# Patient Record
Sex: FEMALE | Race: WHITE | Employment: FULL TIME | URBAN - METROPOLITAN AREA
[De-identification: names, ages, dates, MRNs, and addresses within clinical notes are randomized per-mention and may not be internally consistent; named-entity substitution may affect disease eponyms.]

---

## 2018-10-02 NOTE — H&P
Warren Marquez  10/1/2018 9:58 AM  Location: The [address] [place] [address]  Patient #: [id number]  : 1980  Single / Language: Stephane Boston / Race: White  Female      History of Present Illness   The patient is a 30year old female who presents for a recheck of Wrist Problem. The patient describes having dull ache, sharp pain and other (shooting and throbbing). The problem is described as being located in the right wrist (recheck for possible right writ arthroscopy and triangular fibrocartilage complex repair on 2018). The symptom has been occuring for 13 weeks (for this incident.  She has been diagnosed with tendinitis and this has been going on for years. ). The symptom occurs all the time. The course has been unchanged (and a little worse since the incident.  She grabbed the bannister going down stairs with her right hand. ). The wrist problem is described as mild (to severe; keeping her awake at night.). The wrist problem is aggravated by flexion of the wrist and extension of the wrist. The wrist problem is relieved by brace (and pressure). The symptoms have been associated with painful ROM, decreased ROM (in brace) and swelling,  while the symptoms have not been associated with numbness or tingling. The wrist problem was preceeded by other (rolling out pizza dough and felt something pop and had shooting pain into her fingers). Previous evaluation done by primary care physician. Previous diagnostic tests have included X-Ray. Last office visit was Date: (2018). The treatment performed last visit was injection (Ulnar carpal joint injection) and medication (Prednisone). The patient's dominant hand is their right. Note: I have reviewed the patient's reason for visit, review of systems, and past medical and social history as documented by my staff.  Pertinent items were discussed with the patient.     Problem List/Past Medical  Sleep apnea (780.57  G47.30)    Headache (784.0  R51)   migraine  Gastroesophageal reflux (530.81  K21.9)    Thyroid disease (246.9  E07.9)    Chiari Malformation    Wrist pain, right (719.43  M25.531)    Right wrist tendonitis (727.05  M77.8)      Allergies   Penicillins   family of cillins  Tylenol with Codeine #3 *ANALGESICS - OPIOID*      Family History   Family history unknown      Social History  Tobacco Use   Current every day smoker. 1/2 to 1 pack per day  Non Drinker/No Alcohol Use    No Drug Use      Medication History  Tylenol Extra Strength  (1000mg Oral prn) Specific strength unknown - Active. Medications Reconciled     Past Surgical History   Cervical Coning   2012  Posterior Fossa Decompression   2013  Lymph Node Removed in Neck   2013    Diagnostic Studies History   MRI   Date: 8/13/2018, Results: . Right Wrist: Thickening and increased intermediate signal in the peripheral lateral ulnar styloid attachment of the TFCC, could represent degeneration of scarring from prior injury, no discrete visualized tear. This could be better evaluation with dedicated MR arthrogram as clinically warranted. Mild extensor carpi ulnaris tendinosis without evidence for tear. X-ray   Date: 7/18/2018, Results: . Right Wrist: No acute pathology in the right wrist. Stable exam.    Other Problems   TFCC (triangular fibrocartilage complex) injury, right, subsequent encounter (V58.89  S69.81XD)          Review of Systems   General Not Present- Chills and Fever. Skin Not Present- Bruising, Pallor and Skin Color Changes. Respiratory Not Present- Cough and Difficulty Breathing. Cardiovascular Not Present- Chest Pain and Fainting / Blacking Out. Musculoskeletal Present- Decreased Range of Motion and Joint Pain. Neurological Not Present- Dysesthesia, Paresthesias and Weakness In Extremities. Hematology Not Present- Abnormal Bleeding and Petechiae. Physical Exam   General  Mental Status - Alert.   General Appearance - Cooperative and Well groomed, Not in acute distress, Not Sickly. Orientation - Oriented X4. Build & Nutrition - Well nourished and Well developed. Posture - Normal posture. Gait - Normal.  Hydration - Well hydrated. Voice - Normal.    Integumentary  General Characteristics  Color - normal coloration of skin. Skin Moisture - normal skin moisture. Texture - normal skin texture. Chest and Lung Exam  Inspection  Chest Wall - Normal. Shape - Normal and Symmetric. Movements - Symmetrical. Accessory muscles - No use of accessory muscles in breathing. Auscultation  Breath sounds - Normal. Adventitious sounds - No Adventitious sounds. Cardiovascular  Auscultation  Heart Sounds - S1 WNL and S2 WNL, No S3. Murmurs & Other Heart Sounds - Auscultation of the heart reveals - No Murmurs. Abdomen  Inspection - Inspection Normal.    Musculoskeletal  Upper Extremity    Hand/Wrist:  Wrist: Inspection and Palpation - Tenderness - moderate, localized and over the ulnar aspect, (R). Swelling - boggy, (R). Wrist - Functional Testing - Ayon Test (Scaphoid Shift Test) positive, (R). Hand - Evaluation of related systems reveals - no digital clubbing or cyanosis and neurovascularly intact bilaterally. Inspection and Palpation - Crepitus - no crepitus bilateral. Sensation is - normal, (R). Instability - Right - no instability or laxity. Hand - Deformities/Malalignments/Discrepancies - no deformities, malalignments, or discrepancies. Functional Testing - Axial Load Test positive, (R). Phalanges:  Right: Thumb - Functional Testing - Flexor Pollicis Longus is intact. Index Finger - Functional Testing - Flexor Digitorum Superficialis is intact and Flexor Digitorum Profundus is intact. Long Finger - Functional Testing - Flexor Digitorum Superficialis is intact and Flexor Digitorum Profundus is intact. Ring Finger - Functional Testing - Flexor Digitorum Superficialis is intact and Flexor Digitorum Profundus is intact.  Small Finger - Functional Testing - Flexor Digitorum Superficialis is intact and Flexor Digitorum Profundus is intact. Assessment & Plan   TFCC (triangular fibrocartilage complex) injury, right, subsequent encounter (V58.89  S69.81XD)  Impression: Her symptoms continue despite the injection and prednisone. We discussed options. Physical exam still points to the TFCC. The MRI also suggests injury in this area. After the discussion she elected to proceed with wrist arthroscopy and potentially TFCC repair. We discussed with her the potential risk in the future for an ulnar shortening osteotomy. We also discussed continued pain. She states she understands and agrees to proceed. Current Plans  The procedure was discussed with the patient and a written consent was obtained and questions were answered.  Risks of the procedure were discussed and include but are not limited to infection, bleeding, nerve, vascular injury as well as the need for future procedures.  It was also discussed the importance of compliance with the treatment directions and participation in the care of the treated extremity. Patient wishes to proceed with the planned RIGHT wrist arthroscopy and triangular fibrocartilage complex repair. List of current medications documented by the Provider ()  Pt Education - General Patient Education: discussed with patient and provided information. Note: Voice recognition software may have been used to generate this report, which may have resulted in some phonetic based errors in grammar and contents.  Even though attempts were made to correct all the mistakes, some may have been missed, and remain in the body of the document.         Signed by Nevaeh Cotto MD

## 2018-10-15 ENCOUNTER — ANESTHESIA EVENT (OUTPATIENT)
Dept: SURGERY | Age: 38
End: 2018-10-15
Payer: MEDICAID

## 2018-10-15 RX ORDER — ACETAMINOPHEN 500 MG
500 TABLET ORAL
COMMUNITY
End: 2018-10-16

## 2018-10-15 RX ORDER — MAG HYDROX/ALUMINUM HYD/SIMETH 200-200-20
30 SUSPENSION, ORAL (FINAL DOSE FORM) ORAL
COMMUNITY
End: 2020-10-01

## 2018-10-15 NOTE — PERIOP NOTES
PAT - SURGICAL PRE-ADMISSION INSTRUCTIONS    NAME:  Yoshi Bass Marquez                                                          TODAY'S DATE:  10/15/2018    SURGERY DATE:  10/16/2018                                  SURGERY ARRIVAL TIME:   0815    1. Do NOT eat or drink anything, including candy or gum, after MIDNIGHT on 10/15/18 , unless you have specific instructions from your Surgeon or Anesthesia Provider to do so. 2. No smoking on the day of surgery. 3. No alcohol 24 hours prior to the day of surgery. 4. No recreational drugs for one week prior to the day of surgery. 5. Leave all valuables, including money/purse, at home. 6. Remove all jewelry, nail polish, makeup (including mascara); no lotions, powders, deodorant, or perfume/cologne/after shave. 7. Glasses/Contact lenses and Dentures may be worn to the hospital.  They will be removed prior to surgery. 8. Call your doctor if symptoms of a cold or illness develop within 24 ours prior to surgery. 9. AN ADULT MUST DRIVE YOU HOME AFTER OUTPATIENT SURGERY. 10. If you are having an OUTPATIENT procedure, please make arrangements for a responsible adult to be with you for 24 hours after your surgery. 11. If you are admitted to the hospital, you will be assigned to a bed after surgery is complete. Normally a family member will not be able to see you until you are in your assigned bed. 15. Family is encouraged to accompany you to the hospital.  We ask visitors in the treatment area to be limited to ONE person at a time to ensure patient privacy. EXCEPTIONS WILL BE MADE AS NEEDED. 15. Children under 12 are discouraged from entering the treatment area and need to be supervised by an adult when in the waiting room. Special Instructions:    NONE. Patient Prep:    shower with anti-bacterial soap    These surgical instructions were reviewed with Fide Rutherford during the PAT phone call. Directions:   On the morning of surgery, please go to [facility]. Enter the building from the [facility] entrance, 1st floor (next to the Emergency Room entrance). Take the elevator to the 2nd floor. Sign in at the Registration Desk.     If you have any questions and/or concerns, please do not hesitate to call:  (Prior to the day of surgery)  [facility] unit:  [phone]  (Day of surgery)  [facility] unit:  [phone]

## 2018-10-16 ENCOUNTER — HOSPITAL ENCOUNTER (OUTPATIENT)
Age: 38
Setting detail: OUTPATIENT SURGERY
Discharge: HOME | End: 2018-10-16
Attending: ORTHOPAEDIC SURGERY | Admitting: ORTHOPAEDIC SURGERY
Payer: MEDICAID

## 2018-10-16 ENCOUNTER — ANESTHESIA (OUTPATIENT)
Dept: SURGERY | Age: 38
End: 2018-10-16
Payer: MEDICAID

## 2018-10-16 VITALS
WEIGHT: 185.38 LBS | HEIGHT: 63 IN | OXYGEN SATURATION: 88 % | TEMPERATURE: 98.5 F | RESPIRATION RATE: 15 BRPM | HEART RATE: 63 BPM | SYSTOLIC BLOOD PRESSURE: 116 MMHG | BODY MASS INDEX: 32.85 KG/M2 | DIASTOLIC BLOOD PRESSURE: 69 MMHG

## 2018-10-16 DIAGNOSIS — S63.591D TFCC (TRIANGULAR FIBROCARTILAGE COMPLEX) TEAR, RIGHT, SUBSEQUENT ENCOUNTER: Primary | ICD-10-CM

## 2018-10-16 PROBLEM — S69.82XD TFCC (TRIANGULAR FIBROCARTILAGE COMPLEX) INJURY, LEFT, SUBSEQUENT ENCOUNTER: Status: ACTIVE | Noted: 2018-10-16

## 2018-10-16 LAB — HCG UR QL: NEGATIVE

## 2018-10-16 PROCEDURE — 76210000026 HC REC RM PH II 1 TO 1.5 HR: Performed by: ORTHOPAEDIC SURGERY

## 2018-10-16 PROCEDURE — 77030018834: Performed by: ORTHOPAEDIC SURGERY

## 2018-10-16 PROCEDURE — 76060000033 HC ANESTHESIA 1 TO 1.5 HR: Performed by: ORTHOPAEDIC SURGERY

## 2018-10-16 PROCEDURE — 77030032490 HC SLV COMPR SCD KNE COVD -B: Performed by: ORTHOPAEDIC SURGERY

## 2018-10-16 PROCEDURE — 74011250637 HC RX REV CODE- 250/637: Performed by: NURSE ANESTHETIST, CERTIFIED REGISTERED

## 2018-10-16 PROCEDURE — 76942 ECHO GUIDE FOR BIOPSY: CPT | Performed by: ANESTHESIOLOGY

## 2018-10-16 PROCEDURE — 76010000161 HC OR TIME 1 TO 1.5 HR INTENSV-TIER 1: Performed by: ORTHOPAEDIC SURGERY

## 2018-10-16 PROCEDURE — 74011250636 HC RX REV CODE- 250/636

## 2018-10-16 PROCEDURE — 74011250636 HC RX REV CODE- 250/636: Performed by: PHYSICIAN ASSISTANT

## 2018-10-16 PROCEDURE — 74011000272 HC RX REV CODE- 272: Performed by: ORTHOPAEDIC SURGERY

## 2018-10-16 PROCEDURE — 74011000250 HC RX REV CODE- 250: Performed by: ORTHOPAEDIC SURGERY

## 2018-10-16 PROCEDURE — 77030008591 HC TRAP FNGR HK CNMD -B: Performed by: ORTHOPAEDIC SURGERY

## 2018-10-16 PROCEDURE — 64415 NJX AA&/STRD BRCH PLXS IMG: CPT | Performed by: ANESTHESIOLOGY

## 2018-10-16 PROCEDURE — 74011250636 HC RX REV CODE- 250/636: Performed by: ANESTHESIOLOGY

## 2018-10-16 PROCEDURE — 74011250636 HC RX REV CODE- 250/636: Performed by: NURSE ANESTHETIST, CERTIFIED REGISTERED

## 2018-10-16 PROCEDURE — 77030003601 HC NDL NRV BLK BBMI -A: Performed by: ANESTHESIOLOGY

## 2018-10-16 PROCEDURE — 77030003015: Performed by: ORTHOPAEDIC SURGERY

## 2018-10-16 PROCEDURE — 77030002966 HC SUT PDS J&J -A: Performed by: ORTHOPAEDIC SURGERY

## 2018-10-16 PROCEDURE — 81025 URINE PREGNANCY TEST: CPT

## 2018-10-16 PROCEDURE — 77030020753 HC CUF TRNQT 1BLA STRY -B: Performed by: ORTHOPAEDIC SURGERY

## 2018-10-16 RX ORDER — SODIUM CHLORIDE 0.9 % (FLUSH) 0.9 %
5-10 SYRINGE (ML) INJECTION EVERY 8 HOURS
Status: DISCONTINUED | OUTPATIENT
Start: 2018-10-16 | End: 2018-10-16 | Stop reason: HOSPADM

## 2018-10-16 RX ORDER — ROPIVACAINE HYDROCHLORIDE 2 MG/ML
30 INJECTION, SOLUTION EPIDURAL; INFILTRATION; PERINEURAL
Status: DISCONTINUED | OUTPATIENT
Start: 2018-10-16 | End: 2018-10-16 | Stop reason: HOSPADM

## 2018-10-16 RX ORDER — PROPOFOL 10 MG/ML
INJECTION, EMULSION INTRAVENOUS
Status: DISCONTINUED | OUTPATIENT
Start: 2018-10-16 | End: 2018-10-16 | Stop reason: HOSPADM

## 2018-10-16 RX ORDER — DIPHENHYDRAMINE HYDROCHLORIDE 50 MG/ML
25 INJECTION, SOLUTION INTRAMUSCULAR; INTRAVENOUS
Status: CANCELLED | OUTPATIENT
Start: 2018-10-16

## 2018-10-16 RX ORDER — SODIUM CHLORIDE 0.9 % (FLUSH) 0.9 %
5-10 SYRINGE (ML) INJECTION AS NEEDED
Status: DISCONTINUED | OUTPATIENT
Start: 2018-10-16 | End: 2018-10-16 | Stop reason: HOSPADM

## 2018-10-16 RX ORDER — SODIUM CHLORIDE, SODIUM LACTATE, POTASSIUM CHLORIDE, CALCIUM CHLORIDE 600; 310; 30; 20 MG/100ML; MG/100ML; MG/100ML; MG/100ML
25 INJECTION, SOLUTION INTRAVENOUS CONTINUOUS
Status: DISCONTINUED | OUTPATIENT
Start: 2018-10-16 | End: 2018-10-16 | Stop reason: HOSPADM

## 2018-10-16 RX ORDER — OXYCODONE AND ACETAMINOPHEN 5; 325 MG/1; MG/1
1 TABLET ORAL AS NEEDED
Status: CANCELLED | OUTPATIENT
Start: 2018-10-16

## 2018-10-16 RX ORDER — ONDANSETRON 2 MG/ML
INJECTION INTRAMUSCULAR; INTRAVENOUS AS NEEDED
Status: DISCONTINUED | OUTPATIENT
Start: 2018-10-16 | End: 2018-10-16 | Stop reason: HOSPADM

## 2018-10-16 RX ORDER — FENTANYL CITRATE 50 UG/ML
50 INJECTION, SOLUTION INTRAMUSCULAR; INTRAVENOUS AS NEEDED
Status: CANCELLED | OUTPATIENT
Start: 2018-10-16

## 2018-10-16 RX ORDER — FAMOTIDINE 20 MG/1
20 TABLET, FILM COATED ORAL ONCE
Status: COMPLETED | OUTPATIENT
Start: 2018-10-16 | End: 2018-10-16

## 2018-10-16 RX ORDER — CLINDAMYCIN PHOSPHATE 600 MG/50ML
600 INJECTION INTRAVENOUS ONCE
Status: COMPLETED | OUTPATIENT
Start: 2018-10-16 | End: 2018-10-16

## 2018-10-16 RX ORDER — MIDAZOLAM HYDROCHLORIDE 1 MG/ML
2 INJECTION, SOLUTION INTRAMUSCULAR; INTRAVENOUS ONCE
Status: COMPLETED | OUTPATIENT
Start: 2018-10-16 | End: 2018-10-16

## 2018-10-16 RX ORDER — OXYCODONE AND ACETAMINOPHEN 10; 325 MG/1; MG/1
1-2 TABLET ORAL
Qty: 20 TAB | Refills: 0 | Status: SHIPPED | OUTPATIENT
Start: 2018-10-16 | End: 2020-10-01

## 2018-10-16 RX ORDER — DEXAMETHASONE SODIUM PHOSPHATE 4 MG/ML
INJECTION, SOLUTION INTRA-ARTICULAR; INTRALESIONAL; INTRAMUSCULAR; INTRAVENOUS; SOFT TISSUE AS NEEDED
Status: DISCONTINUED | OUTPATIENT
Start: 2018-10-16 | End: 2018-10-16 | Stop reason: HOSPADM

## 2018-10-16 RX ORDER — SODIUM CHLORIDE, SODIUM LACTATE, POTASSIUM CHLORIDE, CALCIUM CHLORIDE 600; 310; 30; 20 MG/100ML; MG/100ML; MG/100ML; MG/100ML
75 INJECTION, SOLUTION INTRAVENOUS CONTINUOUS
Status: CANCELLED | OUTPATIENT
Start: 2018-10-16

## 2018-10-16 RX ORDER — LIDOCAINE HYDROCHLORIDE 20 MG/ML
INJECTION, SOLUTION EPIDURAL; INFILTRATION; INTRACAUDAL; PERINEURAL AS NEEDED
Status: DISCONTINUED | OUTPATIENT
Start: 2018-10-16 | End: 2018-10-16 | Stop reason: HOSPADM

## 2018-10-16 RX ORDER — FENTANYL CITRATE 50 UG/ML
100 INJECTION, SOLUTION INTRAMUSCULAR; INTRAVENOUS ONCE
Status: COMPLETED | OUTPATIENT
Start: 2018-10-16 | End: 2018-10-16

## 2018-10-16 RX ORDER — KETOROLAC TROMETHAMINE 30 MG/ML
INJECTION, SOLUTION INTRAMUSCULAR; INTRAVENOUS AS NEEDED
Status: DISCONTINUED | OUTPATIENT
Start: 2018-10-16 | End: 2018-10-16 | Stop reason: HOSPADM

## 2018-10-16 RX ADMIN — DEXAMETHASONE SODIUM PHOSPHATE 4 MG: 4 INJECTION, SOLUTION INTRA-ARTICULAR; INTRALESIONAL; INTRAMUSCULAR; INTRAVENOUS; SOFT TISSUE at 10:06

## 2018-10-16 RX ADMIN — CLINDAMYCIN PHOSPHATE 600 MG: 600 INJECTION INTRAVENOUS at 10:01

## 2018-10-16 RX ADMIN — SODIUM CHLORIDE, SODIUM LACTATE, POTASSIUM CHLORIDE, AND CALCIUM CHLORIDE 25 ML/HR: 600; 310; 30; 20 INJECTION, SOLUTION INTRAVENOUS at 09:16

## 2018-10-16 RX ADMIN — PROPOFOL 200 MCG/KG/MIN: 10 INJECTION, EMULSION INTRAVENOUS at 10:06

## 2018-10-16 RX ADMIN — MIDAZOLAM 2 MG: 1 INJECTION INTRAMUSCULAR; INTRAVENOUS at 09:26

## 2018-10-16 RX ADMIN — LIDOCAINE HYDROCHLORIDE 100 MG: 20 INJECTION, SOLUTION EPIDURAL; INFILTRATION; INTRACAUDAL; PERINEURAL at 10:06

## 2018-10-16 RX ADMIN — KETOROLAC TROMETHAMINE 30 MG: 30 INJECTION, SOLUTION INTRAMUSCULAR; INTRAVENOUS at 10:06

## 2018-10-16 RX ADMIN — ONDANSETRON 4 MG: 2 INJECTION INTRAMUSCULAR; INTRAVENOUS at 10:06

## 2018-10-16 RX ADMIN — FENTANYL CITRATE 100 MCG: 50 INJECTION, SOLUTION INTRAMUSCULAR; INTRAVENOUS at 09:26

## 2018-10-16 RX ADMIN — FAMOTIDINE 20 MG: 20 TABLET ORAL at 09:20

## 2018-10-16 NOTE — PERIOP NOTES
Phase 2 Recovery Summary  Patient arrived to Phase 2 at 1127  Report received from [place], CRNA  Vitals:    10/16/18 0904 10/16/18 0932 10/16/18 1127 10/16/18 1131   BP: 120/76  116/69 116/69   Pulse: 72  76 63   Resp:  15     Temp: 98.5 °F (36.9 °C)      SpO2: 97%  (!) 88%    Weight: 84.1 kg (185 lb 6 oz)      Height: 5' 3\" (1.6 m)          oriented to time, place, person and situation    Lines and Drains  Peripheral Intravenous Line:   Peripheral IV 10/16/18 Left Arm (Active)   Site Assessment Clean 10/16/2018  9:16 AM   Phlebitis Assessment 0 10/16/2018  9:16 AM   Infiltration Assessment 0 10/16/2018  9:16 AM   Dressing Status Clean, dry, & intact 10/16/2018  9:16 AM   Dressing Type Transparent;Tape 10/16/2018  9:16 AM   Hub Color/Line Status Pink; Infusing 10/16/2018  9:16 AM       Wound  Wound Wrist Right (Active)   DRESSING STATUS Clean, dry, and intact 10/16/2018 11:12 AM   DRESSING TYPE 4 x 4;Xeroform 10/16/2018 11:12 AM   SPLINT TYPE/MATERIAL Cast, plaster 10/16/2018 11:12 AM   Number of days:0          Patient arrived in phase 2 recovery lethargic. Patient placed on 3L of O2 via nasal cannula to keep saturation above 88%. Patient voiced mild discomfort to RUE but stated that it was tolerable. Discharge instructions given to patient and family prior to discharge. Instructions acknowledged by all parties. O2 rechecked prior to discharge and noted to be 97% on room air. Arm placed in sling and ice pack applied. Patient taken to private vehicle via wheelchair and discharged from facility in stable condition. Patient discharged to home with mother  at 200.     [facility]

## 2018-10-16 NOTE — ANESTHESIA POSTPROCEDURE EVALUATION
Post-Anesthesia Evaluation and Assessment Patient: Jessica Hdz MRN: [id number]  SSN: [id number] YOB: 1980  Age: 41 y.o. Sex: female Cardiovascular Function/Vital Signs Visit Vitals  /69 (BP 1 Location: Left arm)  Pulse 63  Temp 36.9 °C (98.5 °F)  Resp 15  Ht 5' 3\" (1.6 m)  Wt 84.1 kg (185 lb 6 oz)  SpO2 (!) 88%  BMI 32.84 kg/m2 Patient is status post general, regional anesthesia for Procedure(s): RIGHT WRIST ARTHROSCOPY AND TFCC REPAIR. Nausea/Vomiting: None Postoperative hydration reviewed and adequate. Pain: 
Pain Scale 1: Numeric (0 - 10) (10/16/18 4999) Pain Intensity 1: 5 (10/16/18 0904) Managed Neurological Status:  
Neuro (WDL): Within Defined Limits (10/16/18 0913) At baseline Mental Status and Level of Consciousness: Alert and oriented Pulmonary Status:  
O2 Device: Nasal cannula (10/16/18 1131) Adequate oxygenation and airway patent Complications related to anesthesia: None Post-anesthesia assessment completed. No concerns Signed By: Ana María Muniz CRNA October 16, 2018

## 2018-10-16 NOTE — DISCHARGE INSTRUCTIONS
Candy Montejo PA-C   Upper Extremity Surgery   Discharge Instructions   Please take the time to review the following instructions before you leave the hospital and use them as guidelines during your recovery from surgery. If you have any questions you may contact my office at [phone]. Wound Care/Dressing Changes:   Dont remove your dressing or get them wet. It isnt necessary to apply antibiotic ointment to your incisions. Sutures will be removed at your one week post-op visit. Staples (if you have them) are removed in two weeks. If you have steri-strips over your incision they will start to peel off in 7-10 days as you get them wet. They dont need to be removed prior to that. When they begin to peel off, you may remove them. They should all be removed by 14 days from your surgery. Showering/Bathing: You may shower after your surgery. Your dressing may NOT be removed for showering. Do not take a bath or get into a swimming pool or Jacuzzi until the incisions are completely healed. This may take about 14 days. Do not soak your incision under water. Sling: You are not required to wear your sling and should do so only as needed for comfort. You have no restrictions with regards to the movement of your shoulder. Please push to achieve full range of motion as soon as possible. Please follow motion instructions given at the time of surgery. Ice/Elevation   Continue ice consistently for 24 hours after surgery. After 48 hours, you should ice your hand 3 times per day, for 20 minutes at a time for the next 5 days. After one week from surgery, you may use ice as needed for pain and swelling. Elevate the extremity higher than your heart for the next 24 - 48 hours. If you get throbbing this is telling you to elevate the extremity. Diet:   You may advance to your regular diet as tolerated. Medication:   1.  You will be given a prescription for pain medication when you are discharged from the hospital. Take the medication as needed according to the directions on the prescription bottle. Possible side effects of the medication include dizziness, headache, nausea, vomiting, constipation and urinary retention. If you experience any of these side effects call the office so that we can assist you in relieving them. Discontinue the use of the pain medication if you develop itching, rash, shortness of breath or difficulties swallowing. If these symptoms become severe or arent relieved by discontinuing the medication you should seek immediate medical attention. Refills of pain medication are authorized during office hours only. (7 AM-3PM Mon. thru Fri.)    2. If you were prescribed Percocet/oxycodone you must have a written prescription. These medications legally cannot be called in to the pharmacy. 3. You may take over the counter Ibuprofen/Advil/Aleve between dosages of your pain medication if needed. Do not take Tylenol in addition to your pain medication as most of the pain medication already contains Tylenol. Do not exceed 3000mg of Tylenol per day. Ex: (hydrocodone 5/325g= 325mg of Tylenol)  4. You may resume the medication you were taking prior to surgery. Pain medication may change the effects of any antidepressant medication you are taking. If you have any questions about possible interactions between your regular medications and the pain medication you should consult the physician who prescribes your regular medications. Follow-up:   Check the letter for Follow-up appointment or call [phone] for questions.        DISCHARGE SUMMARY from Nurse    PATIENT INSTRUCTIONS:    After general anesthesia or intravenous sedation, for 24 hours or while taking prescription Narcotics:  · Limit your activities  · Do not drive and operate hazardous machinery  · Do not make important personal or business decisions  · Do  not drink alcoholic beverages  · If you have not urinated within 8 hours after discharge, please contact your surgeon on call. Report the following to your surgeon:  · Excessive pain, swelling, redness or odor of or around the surgical area  · Temperature over 100.5  · Nausea and vomiting lasting longer than 4 hours or if unable to take medications  · Any signs of decreased circulation or nerve impairment to extremity: change in color, persistent  numbness, tingling, coldness or increase pain  · Any questions    What to do at Home:  Recommended activity: Activity as tolerated and no driving for today,     These are general instructions for a healthy lifestyle:    No smoking/ No tobacco products/ Avoid exposure to second hand smoke  Surgeon General's Warning:  Quitting smoking now greatly reduces serious risk to your health. Obesity, smoking, and sedentary lifestyle greatly increases your risk for illness    A healthy diet, regular physical exercise & weight monitoring are important for maintaining a healthy lifestyle    You may be retaining fluid if you have a history of heart failure or if you experience any of the following symptoms:  Weight gain of 3 pounds or more overnight or 5 pounds in a week, increased swelling in our hands or feet or shortness of breath while lying flat in bed. Please call your doctor as soon as you notice any of these symptoms; do not wait until your next office visit. Recognize signs and symptoms of STROKE:    F-face looks uneven    A-arms unable to move or move unevenly    S-speech slurred or non-existent    T-time-call 911 as soon as signs and symptoms begin-DO NOT go       Back to bed or wait to see if you get better-TIME IS BRAIN. Warning Signs of HEART ATTACK     Call 911 if you have these symptoms:   Chest discomfort. Most heart attacks involve discomfort in the center of the chest that lasts more than a few minutes, or that goes away and comes back.  It can feel like uncomfortable pressure, squeezing, fullness, or pain.  Discomfort in other areas of the upper body. Symptoms can include pain or discomfort in one or both arms, the back, neck, jaw, or stomach.  Shortness of breath with or without chest discomfort.  Other signs may include breaking out in a cold sweat, nausea, or lightheadedness. Don't wait more than five minutes to call 911 - MINUTES MATTER! Fast action can save your life. Calling 911 is almost always the fastest way to get lifesaving treatment. Emergency Medical Services staff can begin treatment when they arrive -- up to an hour sooner than if someone gets to the hospital by car. The discharge information has been reviewed with the patient. The patient verbalized understanding. Discharge medications reviewed with the patient and appropriate educational materials and side effects teaching were provided. Patient armband removed and given to patient to take home.   Patient was informed of the privacy risks if armband lost or stolen    ___________________________________________________________________________________________________________________________________

## 2018-10-16 NOTE — IP AVS SNAPSHOT
[address] [facility] [address] 
 
 
 [address] 
[phone] Patient: Griffith Cranker MRN: [id number] :1980 About your hospitalization You were admitted on:  2018 You last received care in the:  [address] PHASE 2 RECOVERY You were discharged on:  2018 Why you were hospitalized Your primary diagnosis was: Tfcc (Triangular Fibrocartilage Complex) Tear, Right, Subsequent Encounter Follow-up Information Follow up With Details Comments Contact Info Maegan Kirby MD  If symptoms worsen, For suture removal, For wound re-check, as scheduled [address] [facility] [address] [facility] [place] [facility] [address] 
[phone] Rangel Paris MD   [address] [facility] [address] [address] 
[phone] Discharge Orders None A check timothy indicates which time of day the medication should be taken. My Medications START taking these medications Instructions Each Dose to Equal  
 Morning Noon Evening Bedtime  
 oxyCODONE-acetaminophen  mg per tablet Commonly known as:  PERCOCET 10 Your last dose was: Your next dose is: Take 1-2 Tabs by mouth every six (6) hours as needed for Pain. Max Daily Amount: 8 Tabs. 1-2 Tab CONTINUE taking these medications Instructions Each Dose to Equal  
 Morning Noon Evening Bedtime CAMBIA 50 mg Pwpk Generic drug:  Diclofenac Potassium Your last dose was: Your next dose is: Take 50 mg by mouth as needed. Indications: Migraine 50 mg  
    
   
   
   
  
 MAALOX ADVANCED 200-200-20 mg/5 mL Susp Generic drug:  alum-mag hydroxide-simeth Your last dose was: Your next dose is: Take 30 mL by mouth every four (4) hours as needed. 30 mL STOP taking these medications TYLENOL EXTRA STRENGTH 500 mg tablet Generic drug:  acetaminophen Where to Get Your Medications Information on where to get these meds will be given to you by the nurse or doctor. ! Ask your nurse or doctor about these medications  
  oxyCODONE-acetaminophen  mg per tablet Opioid Education Prescription Opioids: What You Need to Know: 
 
Prescription opioids can be used to help relieve moderate-to-severe pain and are often prescribed following a surgery or injury, or for certain health conditions. These medications can be an important part of treatment but also come with serious risks. Opioids are strong pain medicines. Examples include hydrocodone, oxycodone, fentanyl, and morphine. Heroin is an example of an illegal opioid. It is important to work with your health care provider to make sure you are getting the safest, most effective care. WHAT ARE THE RISKS AND SIDE EFFECTS OF OPIOID USE? Prescription opioids carry serious risks of addiction and overdose, especially with prolonged use. An opioid overdose, often marked by slow breathing, can cause sudden death. The use of prescription opioids can have a number of side effects as well, even when taken as directed. · Tolerance-meaning you might need to take more of a medication for the same pain relief · Physical dependence-meaning you have symptoms of withdrawal when the medication is stopped. Withdrawal symptoms can include nausea, sweating, chills, diarrhea, stomach cramps, and muscle aches. Withdrawal can last up to several weeks, depending on which drug you took and how long you took it. · Increased sensitivity to pain · Constipation · Nausea, vomiting, and dry mouth · Sleepiness and dizziness · Confusion · Depression · Low levels of testosterone that can result in lower sex drive, energy, and strength · Itching and sweating RISKS ARE GREATER WITH:      
· History of drug misuse, substance use disorder, or overdose · Mental health conditions (such as depression or anxiety) · Sleep apnea · Older age (72 years or older) · Pregnancy Avoid alcohol while taking prescription opioids. Also, unless specifically advised by your health care provider, medications to avoid include: · Benzodiazepines (such as Xanax or Valium) · Muscle relaxants (such as Soma or Flexeril) · Hypnotics (such as Ambien or Lunesta) · Other prescription opioids KNOW YOUR OPTIONS Talk to your health care provider about ways to manage your pain that don't involve prescription opioids. Some of these options may actually work better and have fewer risks and side effects. Consult your physician before adding or stopping any medications, treatments, or physical activity. Options may include: 
· Pain relievers such as acetaminophen, ibuprofen, and naproxen · Some medications that are also used for depression or seizures · Physical therapy and exercise · Counseling to help patients learn how to cope better with triggers of pain and stress. · Application of heat or cold compress · Massage therapy · Relaxation techniques Be Informed Make sure you know the name of your medication, how much and how often to take it, and its potential risks & side effects. IF YOU ARE PRESCRIBED OPIOIDS FOR PAIN: 
· Never take opioids in greater amounts or more often than prescribed. Remember the goal is not to be pain-free but to manage your pain at a tolerable level. · Follow up with your primary care provider to: · Work together to create a plan on how to manage your pain. · Talk about ways to help manage your pain that don't involve prescription opioids. · Talk about any and all concerns and side effects. · Help prevent misuse and abuse. · Never sell or share prescription opioids · Help prevent misuse and abuse.  
· Store prescription opioids in a secure place and out of reach of others (this may include visitors, children, friends, and family). · Safely dispose of unused/unwanted prescription opioids: Find your community drug take-back program or your pharmacy mail-back program, or flush them down the toilet, following guidance from the Food and Drug Administration (www.fda.gov/Drugs/ResourcesForYou). · Visit www.cdc.gov/drugoverdose to learn about the risks of opioid abuse and overdose. · If you believe you may be struggling with addiction, tell your health care provider and ask for guidance or call [employer] at 7-297-079-EAYZ. Discharge Instructions Yaritza Martinez PA-C Upper Extremity Surgery Discharge Instructions Please take the time to review the following instructions before you leave the hospital and use them as guidelines during your recovery from surgery. If you have any questions you may contact my office at [phone]. Wound Care/Dressing Changes:  
Dont remove your dressing or get them wet. It isnt necessary to apply antibiotic ointment to your incisions. Sutures will be removed at your one week post-op visit. Staples (if you have them) are removed in two weeks. If you have steri-strips over your incision they will start to peel off in 7-10 days as you get them wet. They dont need to be removed prior to that. When they begin to peel off, you may remove them. They should all be removed by 14 days from your surgery. Showering/Bathing: You may shower after your surgery. Your dressing may NOT be removed for showering. Do not take a bath or get into a swimming pool or Jacuzzi until the incisions are completely healed. This may take about 14 days. Do not soak your incision under water. Sling: You are not required to wear your sling and should do so only as needed for comfort.  You have no restrictions with regards to the movement of your shoulder. Please push to achieve full range of motion as soon as possible. Please follow motion instructions given at the time of surgery. Ice/Elevation Continue ice consistently for 24 hours after surgery. After 48 hours, you should ice your hand 3 times per day, for 20 minutes at a time for the next 5 days. After one week from surgery, you may use ice as needed for pain and swelling. Elevate the extremity higher than your heart for the next 24 - 48 hours. If you get throbbing this is telling you to elevate the extremity. Diet:  
You may advance to your regular diet as tolerated. Medication:  
1. You will be given a prescription for pain medication when you are discharged from the hospital. Take the medication as needed according to the directions on the prescription bottle. Possible side effects of the medication include dizziness, headache, nausea, vomiting, constipation and urinary retention. If you experience any of these side effects call the office so that we can assist you in relieving them. Discontinue the use of the pain medication if you develop itching, rash, shortness of breath or difficulties swallowing. If these symptoms become severe or arent relieved by discontinuing the medication you should seek immediate medical attention. Refills of pain medication are authorized during office hours only. (7 AM-3PM Mon. thru Fri.) 2. If you were prescribed Percocet/oxycodone you must have a written prescription. These medications legally cannot be called in to the pharmacy. 3. You may take over the counter Ibuprofen/Advil/Aleve between dosages of your pain medication if needed. Do not take Tylenol in addition to your pain medication as most of the pain medication already contains Tylenol. Do not exceed 3000mg of Tylenol per day. Ex: (hydrocodone 5/325g= 325mg of Tylenol) 4. You may resume the medication you were taking prior to surgery.  Pain medication may change the effects of any antidepressant medication you are taking. If you have any questions about possible interactions between your regular medications and the pain medication you should consult the physician who prescribes your regular medications. Follow-up:   Check the letter for Follow-up appointment or call [phone] for questions. DISCHARGE SUMMARY from Nurse PATIENT INSTRUCTIONS: 
 
After general anesthesia or intravenous sedation, for 24 hours or while taking prescription Narcotics: · Limit your activities · Do not drive and operate hazardous machinery · Do not make important personal or business decisions · Do  not drink alcoholic beverages · If you have not urinated within 8 hours after discharge, please contact your surgeon on call. Report the following to your surgeon: 
· Excessive pain, swelling, redness or odor of or around the surgical area · Temperature over 100.5 · Nausea and vomiting lasting longer than 4 hours or if unable to take medications · Any signs of decreased circulation or nerve impairment to extremity: change in color, persistent  numbness, tingling, coldness or increase pain · Any questions What to do at Home: 
Recommended activity: Activity as tolerated and no driving for today, These are general instructions for a healthy lifestyle: No smoking/ No tobacco products/ Avoid exposure to second hand smoke Surgeon General's Warning:  Quitting smoking now greatly reduces serious risk to your health. Obesity, smoking, and sedentary lifestyle greatly increases your risk for illness A healthy diet, regular physical exercise & weight monitoring are important for maintaining a healthy lifestyle You may be retaining fluid if you have a history of heart failure or if you experience any of the following symptoms:  Weight gain of 3 pounds or more overnight or 5 pounds in a week, increased swelling in our hands or feet or shortness of breath while lying flat in bed. Please call your doctor as soon as you notice any of these symptoms; do not wait until your next office visit. Recognize signs and symptoms of STROKE: 
 
F-face looks uneven A-arms unable to move or move unevenly S-speech slurred or non-existent T-time-call 911 as soon as signs and symptoms begin-DO NOT go Back to bed or wait to see if you get better-TIME IS BRAIN. Warning Signs of HEART ATTACK Call 911 if you have these symptoms: 
? Chest discomfort. Most heart attacks involve discomfort in the center of the chest that lasts more than a few minutes, or that goes away and comes back. It can feel like uncomfortable pressure, squeezing, fullness, or pain. ? Discomfort in other areas of the upper body. Symptoms can include pain or discomfort in one or both arms, the back, neck, jaw, or stomach. ? Shortness of breath with or without chest discomfort. ? Other signs may include breaking out in a cold sweat, nausea, or lightheadedness. Don't wait more than five minutes to call [address]! Fast action can save your life. Calling 911 is almost always the fastest way to get lifesaving treatment. Emergency Medical Services staff can begin treatment when they arrive  up to an hour sooner than if someone gets to the hospital by car. The discharge information has been reviewed with the patient. The patient verbalized understanding. Discharge medications reviewed with the patient and appropriate educational materials and side effects teaching were provided. Patient armband removed and given to patient to take home. Patient was informed of the privacy risks if armband lost or stolen 
 
___________________________________________________________________________________________________________________________________ Introducing [facility]!    
 [employer] introduces [employer] patient portal. Now you can access parts of your medical record, email your doctor's office, and request medication refills online. 1. In your internet browser, go to [email or web address] [employer] 2. Click on the First Time User? Click Here link in the Sign In box. You will see the New Member Sign Up page. 3. Enter your Beryllium Access Code exactly as it appears below. You will not need to use this code after youve completed the sign-up process. If you do not sign up before the expiration date, you must request a new code. · [employer] Access Code: [email or web address] Expires: 1/8/2019  3:01 PM 
 
4. Enter the last four digits of your Social Security Number (xxxx) and Date of Birth (mm/dd/yyyy) as indicated and click Submit. You will be taken to the next sign-up page. 5. Create a Beryllium ID. This will be your B[employer]ium login ID and cannot be changed, so think of one that is secure and easy to remember. 6. Create a [employer] password. You can change your password at any time. 7. Enter your Password Reset Question and Answer. This can be used at a later time if you forget your password. 8. Enter your e-mail address. You will receive e-mail notification when new information is available in [address] 9. Click Sign Up. You can now view and download portions of your medical record. 10. Click the Download Summary menu link to download a portable copy of your medical information. If you have questions, please visit the Frequently Asked Questions section of the [employer] website. Remember, [employer] is NOT to be used for urgent needs. For medical emergencies, dial 911. Now available from your iPhone and Android! Introducing James Obrien As a [employer] patient, I wanted to make you aware of our electronic visit tool called James Obrien. [employer] 24/7 allows you to connect within minutes with a medical provider 24 hours a day, seven days a week via a mobile device or tablet or logging into a secure website from your computer. You can access [employer] from anywhere in the [place]. A virtual visit might be right for you when you have a simple condition and feel like you just dont want to get out of bed, or cant get away from work for an appointment, when your regular [facility] provider is not available (evenings, weekends or holidays), or when youre out of town and need minor care. Electronic visits cost only $49 and if the [facility] 24/7 provider determines a prescription is needed to treat your condition, one can be electronically transmitted to a nearby pharmacy*. Please take a moment to enroll today if you have not already done so. The enrollment process is free and takes just a few minutes. To enroll, please download the [facility]/Book&Table re to your tablet or phone, or visit [email or web address] to enroll on your computer. And, as an [address] [place] [address] patient with a [employer] account, the results of your visits will be scanned into your electronic medical record and your primary care provider will be able to view the scanned results. We urge you to continue to see your regular [facility] provider for your ongoing medical care. And while your primary care provider may not be the one available when you seek a James Fernandezfin virtual visit, the peace of mind you get from getting a real diagnosis real time can be priceless. For more information on [employer], view our Frequently Asked Questions (FAQs) at [email or web address]. Sincerely, 
 
Brian Padilla MD 
Chief Medical Officer [employer] *:  certain medications cannot be prescribed via [employer] Providers Seen During Your Hospitalization Provider Specialty Primary office phone Yaquelin Vu MD Orthopedic Surgery [phone] Your Primary Care Physician (PCP) Primary Care Physician Office Phone Office Fax [address], [address] [phone] You are allergic to the following Allergen Reactions Codeine Nausea and Vomiting Keflex (Cephalexin) Rash Blisters Penicillin V Potassium Rash Myalgia All Cillins Recent Documentation Height Weight BMI OB Status Smoking Status 1.6 m 84.1 kg 32.84 kg/m2 Having regular periods Current Every Day Smoker Emergency Contacts Name Discharge Info Relation Home Work Mobile [address] CAREGIVER [3] Parent [1] [phone] Patient Belongings The following personal items are in your possession at time of discharge: 
  Dental Appliances: None  Visual Aid: None Please provide this summary of care documentation to your next provider. Signatures-by signing, you are acknowledging that this After Visit Summary has been reviewed with you and you have received a copy. Patient Signature:  ____________________________________________________________ Date:  ____________________________________________________________  
  
Nnamdi Delacruz Provider Signature:  ____________________________________________________________ Date:  ____________________________________________________________

## 2018-10-16 NOTE — BRIEF OP NOTE
BRIEF OPERATIVE NOTE      BRIEF OPERATIVE NOTE    Patient: Carlene Mccormick MRN: [id number]  CSN: [id number]    YOB: 1980  Age: 41 y.o. Sex: female        Date of Procedure: 10/16/2018     Preoperative Diagnosis: right wrist tfcc tear wrist pain s69.81xd    Postoperative Diagnosis: right wrist tfcc tear wrist pain s69.81xd      Procedure: Procedure(s):  RIGHT WRIST ARTHROSCOPY AND TFCC REPAIR     Surgeon: Anna Girard MD      First Assistant:   Gabrielle Santos  Anesthesia Staff: Anesthesiologist: Theo Stevenson MD  CRNA: Johanny Pereyra CRNA    Anesthesia: General      Local Used: 0 2% lidocaine and 0.05 % marcaine  50:50 mix    Fluid:  700 cc crystalloid    Tourniquet Time:   0 minutes @  200  mmHg        Estimated Blood Loss: < 10 cc    Specimens: * No specimens in log *     Implants: * No implants in log *    Findings: central slit. Peripheral tear    Complications: None; patient tolerated the procedure well.       Anna Girard MD  10/16/2018  11:12 AM      .

## 2018-10-16 NOTE — ANESTHESIA PROCEDURE NOTES
Peripheral Block Start time: 10/16/2018 9:26 AM 
End time: 10/16/2018 9:43 AM 
Performed by: Timur Francis Authorized by: Timur Francis Pre-procedure: Indications: at surgeon's request, post-op pain management and procedure for pain Preanesthetic Checklist: patient identified, risks and benefits discussed, site marked, timeout performed, anesthesia consent given and patient being monitored Timeout Time: 09:24 Block Type:  
Block Type:  Brachial plexus and supraclavicular Laterality:  Right Monitoring:  Standard ASA monitoring, continuous pulse ox, frequent vital sign checks, oxygen, responsive to questions and heart rate Injection Technique:  Single shot Procedures: ultrasound guided and nerve stimulator Prep: chlorhexidine Location:  Supraclavicular Needle Type:  Stimuplex Needle Gauge:  22 G Needle Localization:  Ultrasound guidance and nerve stimulator Medication Injected:  0.5% 
ropivacaine Volume (mL):  25 
 
Assessment: 
Number of attempts:  1 Injection Assessment:  No intravascular symptoms, negative aspiration for blood, local visualized surrounding nerve on ultrasound, ultrasound image on chart, no paresthesia and incremental injection every 5 mL Patient tolerance:  Patient tolerated the procedure well with no immediate complications Location:  PREOP HOLDING Patient given 2 mg IV Versed and 100 mcg IV Fentanyl for sedation.  
 
10/16/2018     9:46 AM     Kush Mead MD

## 2018-10-16 NOTE — ANESTHESIA PREPROCEDURE EVALUATION
Anesthetic History No history of anesthetic complications Review of Systems / Medical History Patient summary reviewed and pertinent labs reviewed Pulmonary Sleep apnea Undiagnosed apnea and smoker Neuro/Psych Within defined limits Comments: HA 
H/o chiari malformation repair Cardiovascular Within defined limits Exercise tolerance: >4 METS 
  
GI/Hepatic/Renal 
  
GERD Endo/Other Obesity Other Findings Physical Exam 
 
Airway Mallampati: II 
TM Distance: 4 - 6 cm Neck ROM: normal range of motion Mouth opening: Normal 
 
 Cardiovascular Regular rate and rhythm,  S1 and S2 normal,  no murmur, click, rub, or gallop Rhythm: regular Rate: normal 
 
 
 
 Dental 
 
Dentition: Lower dentition intact and Upper dentition intact Pulmonary Breath sounds clear to auscultation Abdominal 
GI exam deferred Other Findings Anesthetic Plan ASA: 2 Anesthesia type: general and regional - supraclavicular block Induction: Intravenous Anesthetic plan and risks discussed with: Patient

## 2018-10-16 NOTE — INTERVAL H&P NOTE
H&P Update:  Edward Quintanilla was seen and examined. History and physical has been reviewed. The patient has been examined. There have been no significant clinical changes since the completion of the originally dated History and Physical.  Patient identified by surgeon; surgical site was confirmed by patient and surgeon.     Signed By: Corinne Whitt MD     October 16, 2018 9:07 AM

## 2018-10-17 NOTE — OP NOTES
[address] REPORT    Name:Florentino PAEZ  MR#: [id number]  : 1980  ACCOUNT #: [de-identified]   DATE OF SERVICE: 10/16/2018    PREOPERATIVE DIAGNOSIS:  Right wrist triangular fibrocartilage complex tear. POSTOPERATIVE DIAGNOSIS:  Right wrist triangular fibrocartilage complex tear. PROCEDURES PERFORMED:  Right wrist arthroscopy and triangular fibrocartilage complex  repair. SURGEON:  Miranda Castaneda MD    ASSISTANT:  Rony Barry     ANESTHESIA:  Regional general.    TOURNIQUET TIME:  None used. LOCAL:  None used. She had a supraclavicular block. ESTIMATED BLOOD LOSS:  < 10 cc    SPECIMENS REMOVED:  None. COMPLICATIONS:  None. IMPLANTS:  None. INDICATIONS FOR PROCEDURE:  Patient is a pleasant 61-year-old female who has right wrist pain. We proceeded with conservative management of her wrist pain including immobilization. She has continued tenderness at the distal ulna. We proceeded with an injection with short-term relief, but then the symptoms continued. We discussed with her proceeding with arthroscopy and potential TFCC repair. The MRI suggested a central tear of the TFCC. There was, however, synovitis along the periphery. Risks and benefits of surgery were discussed with her including, but not limited to infection, bleeding, neurovascular injury, need for future surgeries, as well as continued pain. She states she understands and agrees to proceed. NARRATION OF PROCEDURE:  After proper consent was obtained, the extremity was marked. Regional anesthesia was performed. The patient was brought to the OR, received IV sedation general.  Limb was prepped and draped. A proper timeout was taken. Perioperative antibiotics given. Limb was placed in a [employer] traction tower with 10 pounds of traction. Upon completion of this, the 3-4 portal was insufflated. An 11 blade was used, followed by blunt dissection into the joint. The scope was entered.   A 4-5 portal was established in similar fashion. Outflow was established at [facility]. Bringing the scope in, one could see the volar ligament, the radioscaphocapitate ligaments, both long and short. With this, we came over the scapholunate ligament, was found to be intact. The dorsal surface of the scapholunate ligament was found to be intact. One could see evidence that the TFCC had a small central split. Along the periphery, there was evidence of synovitis. The shaver was brought in and we removed the dorsal synovitis along the dorsal rim of the TFCC. It is also important to note that when probing the TFCC, it was more of a soft featherbed palpation than it was a taut TFCC that was functional.    We then proceeded with removing some synovitis along the radial aspect of the wrist as well. Then, again, probing the rim one could see evidence of the rent in the TFCC. Therefore, we proceeded with TFCC repair. A longitudinal incision was made over the extensor carpi ulnaris. Sharp dissection through skin followed by blunt dissection. Care was taken to avoid the dorsal sensory branch of the ulnar nerve and branches. The sheath to the extensor carpi ulnaris was incised. The extensor carpi ulnaris was delivered ulnarly. This exposed the capsule and the subsheath. We then proceeded with placing 2 separate sutures, horizontal mattress-type sutures, in order to repair the TFCC to the capsule. Once these were tied, the TFCC had a nice taut texture and more consistent with that of a healthy TFCC and a trampoline effect. With this, we then proceeded with repairing the retinaculum of the extensor carpi ulnaris. I did tighten this as the extensor carpi ulnaris did have some play within its tendon sheath. We then proceeded with closing these wounds using Monocryl with a subcuticular. Xeroform dressing, fluffs and a sugar tong splint were applied. Postoperatively, she is to keep it iced and elevated.   There were no complications. EBL was less than 20 mL and there were no implants.       Elmer Cat MD       [email or web address]  D: 10/16/2018 17:00     T: 10/17/2018 07:06  JOB #: [id number]

## 2020-12-26 PROBLEM — O42.90 PROM (PREMATURE RUPTURE OF MEMBRANES): Status: ACTIVE | Noted: 2020-12-26

## (undated) DEVICE — DIGIT TRAP FINGER GRASPING DEVICE: Brand: DIGIT TRAP

## (undated) DEVICE — SOL IRR L R 3000ML BG --

## (undated) DEVICE — BANDAGE COMPR W4INXL5YD BGE COHESIVE SELF ADH ADBAN CBN1104] AVCOR HEALTHCARE PRODUCTS INC]

## (undated) DEVICE — KENDALL SCD EXPRESS SLEEVES, KNEE LENGTH, MEDIUM: Brand: KENDALL SCD

## (undated) DEVICE — NEEDLE HYPO 20GA L1IN YEL POLYPR HUB S STL REG BVL STR W/O

## (undated) DEVICE — DISPOSABLE TOURNIQUET CUFF SINGLE BLADDER, SINGLE PORT AND QUICK CONNECT CONNECTOR: Brand: COLOR CUFF

## (undated) DEVICE — SUTURE PDS II SZ 2-0 L27IN ABSRB VLT L26MM CT-2 1/2 CIR Z333H

## (undated) DEVICE — LARGE BORE STOPCOCK EXTENSION SET, MALE LUER LOCK ADAPTER WITH RETRACTABLE COLLAR

## (undated) DEVICE — 3M™ COBAN™ STERILE SELF-ADHERENT WRAP, 1584S, 4 IN X 5 YD (10 CM X 4,5 M), 18 ROLLS/CASE: Brand: 3M™ COBAN™

## (undated) DEVICE — FABRIC REINFORCED, SURGICAL GOWN, XL: Brand: CONVERTORS

## (undated) DEVICE — GAUZE SPONGES,12 PLY: Brand: CURITY

## (undated) DEVICE — KIT PROC EXTRM HND FT CUST LF --

## (undated) DEVICE — NEEDLE HYPO 18GA L1.5IN PNK POLYPR HUB S STL REG BVL STR

## (undated) DEVICE — SYRINGE MED 20ML STD CLR PLAS LUERLOCK TIP N CTRL DISP

## (undated) DEVICE — DRAPE,REIN 53X77,STERILE: Brand: MEDLINE

## (undated) DEVICE — TFCC MENDER DISPOSABLE SUTURE SYSTEM

## (undated) DEVICE — 3M™ TEGADERM™ TRANSPARENT FILM DRESSING FRAME STYLE, 1626W, 4 IN X 4-3/4 IN (10 CM X 12 CM), 50/CT 4CT/CASE: Brand: 3M™ TEGADERM™

## (undated) DEVICE — ST BIAS STOCKINETTE: Brand: DEROYAL

## (undated) DEVICE — GAMMEX® NON-LATEX SIZE 8, STERILE NEOPRENE POWDER-FREE SURGICAL GLOVE: Brand: GAMMEX

## (undated) DEVICE — DRESSING,GAUZE,XEROFORM,CURAD,1"X8",ST: Brand: CURAD

## (undated) DEVICE — OCCLUSIVE GAUZE STRIP,3% BISMUTH TRIBROMOPHENATE IN PETROLATUM BLEND: Brand: XEROFORM